# Patient Record
Sex: FEMALE | Race: WHITE | NOT HISPANIC OR LATINO | URBAN - METROPOLITAN AREA
[De-identification: names, ages, dates, MRNs, and addresses within clinical notes are randomized per-mention and may not be internally consistent; named-entity substitution may affect disease eponyms.]

---

## 2020-11-20 ENCOUNTER — TELEPHONE ENCOUNTER (OUTPATIENT)
Dept: URBAN - METROPOLITAN AREA CLINIC 92 | Facility: CLINIC | Age: 45
End: 2020-11-20

## 2020-12-08 ENCOUNTER — OFFICE VISIT (OUTPATIENT)
Dept: URBAN - NONMETROPOLITAN AREA CLINIC 13 | Facility: CLINIC | Age: 45
End: 2020-12-08

## 2021-01-05 ENCOUNTER — OFFICE VISIT (OUTPATIENT)
Dept: URBAN - NONMETROPOLITAN AREA CLINIC 13 | Facility: CLINIC | Age: 46
End: 2021-01-05

## 2021-08-03 ENCOUNTER — WEB ENCOUNTER (OUTPATIENT)
Dept: URBAN - NONMETROPOLITAN AREA CLINIC 2 | Facility: CLINIC | Age: 46
End: 2021-08-03

## 2021-08-12 ENCOUNTER — LAB OUTSIDE AN ENCOUNTER (OUTPATIENT)
Dept: URBAN - NONMETROPOLITAN AREA CLINIC 13 | Facility: CLINIC | Age: 46
End: 2021-08-12

## 2021-08-12 ENCOUNTER — OFFICE VISIT (OUTPATIENT)
Dept: URBAN - NONMETROPOLITAN AREA CLINIC 13 | Facility: CLINIC | Age: 46
End: 2021-08-12
Payer: OTHER GOVERNMENT

## 2021-08-12 ENCOUNTER — WEB ENCOUNTER (OUTPATIENT)
Dept: URBAN - NONMETROPOLITAN AREA CLINIC 13 | Facility: CLINIC | Age: 46
End: 2021-08-12

## 2021-08-12 VITALS
DIASTOLIC BLOOD PRESSURE: 86 MMHG | BODY MASS INDEX: 38.25 KG/M2 | TEMPERATURE: 98 F | HEART RATE: 74 BPM | HEIGHT: 68 IN | SYSTOLIC BLOOD PRESSURE: 151 MMHG | WEIGHT: 252.4 LBS

## 2021-08-12 DIAGNOSIS — R19.7 DIARRHEA: ICD-10-CM

## 2021-08-12 DIAGNOSIS — K21.9 GERD (GASTROESOPHAGEAL REFLUX DISEASE): ICD-10-CM

## 2021-08-12 DIAGNOSIS — R10.84 ABDOMINAL CRAMPING, GENERALIZED: ICD-10-CM

## 2021-08-12 PROCEDURE — 99214 OFFICE O/P EST MOD 30 MIN: CPT | Performed by: INTERNAL MEDICINE

## 2021-08-12 RX ORDER — GUAIFEN/P-PROPANOLAMIN/CODEINE
EXPECTORANT ORAL
Qty: 0 | Refills: 0 | Status: ACTIVE | COMMUNITY
Start: 1900-01-01 | End: 1900-01-01

## 2021-08-12 RX ORDER — OMEPRAZOLE 40 MG/1
1 CAPSULE 30 MINUTES BEFORE MEALS CAPSULE, DELAYED RELEASE PELLETS ORAL BID
Qty: 180 | Refills: 3
Start: 2018-07-22

## 2021-08-12 RX ORDER — DICYCLOMINE HYDROCHLORIDE 10 MG/1
TAKE 1 CAPSULE (10 MG) BY ORAL ROUTE 3 TIMES PER DAY 30MINS PREMEAL FOR ABDOMINAL PAIN/DIARRHEA CAPSULE ORAL THREE TIMES A DAY
Qty: 90 TABLET | Refills: 6
Start: 2019-03-14 | End: 2022-03-09

## 2021-08-12 RX ORDER — AMITRIPTYLINE HYDROCHLORIDE 75 MG/1
TABLET, FILM COATED ORAL
Qty: 0 | Refills: 0 | Status: ACTIVE | COMMUNITY
Start: 1900-01-01 | End: 1900-01-01

## 2021-08-12 NOTE — HPI-TODAY'S VISIT:
8/12/2021 Ms. Samantha Davalos is here for abdominal pain and diarrhea. She was last seen in 2019 for similar symptoms and JIMENA. She had a normal EGD/Colon/CTE/GES at that time. Her symptoms were well controlled with omeprazole and bentyl. She was doing really well so she stopped all her medications the first of the year. In March, she started having pain after eating and diarrhea again. She restarted the bentyl and omeprazole. They do not seem to be helping. She has also noticed fat in her stool and her stool smells like "fried chicken". Her stool is also pale. She no longer had a GB. She denies any hx of pancreatitis. She is not a diabetic. She tried to change her diet to low fat with no change. CS

## 2021-08-12 NOTE — HPI-OTHER HISTORIES
2019   Ms. Samantha Davalos is here for f/u of upper mid abdominal pain, diarrhea after eating, and reflux. She had an EGD that showed gastritis and duodenitis and a colonoscopy that was normal and normal random bx. She had a CTE that showed surgical changes from previous hernia surgery. At her last OV, she was started on omeprazole 40mg BID and bentyl TID. She had a GES that was normal. Today, she is feeling better. Her upper abdominal pain is starting to improve. Her diarrhea improved with the bentyl then she became constipated. She is only taking the Bentyl prn. Overall, she is feeling better. CS

## 2021-08-12 NOTE — PHYSICAL EXAM GASTROINTESTINAL
Abdomen , soft, epigastric tender, nondistended , no guarding or rigidity , no masses palpable , normal bowel sounds , Liver and Spleen , no hepatosplenomegaly , liver nontender , spleen not palpable

## 2021-09-21 ENCOUNTER — WEB ENCOUNTER (OUTPATIENT)
Dept: URBAN - NONMETROPOLITAN AREA CLINIC 13 | Facility: CLINIC | Age: 46
End: 2021-09-21

## 2021-09-21 ENCOUNTER — OFFICE VISIT (OUTPATIENT)
Dept: URBAN - NONMETROPOLITAN AREA CLINIC 13 | Facility: CLINIC | Age: 46
End: 2021-09-21
Payer: OTHER GOVERNMENT

## 2021-09-21 VITALS
BODY MASS INDEX: 38.04 KG/M2 | WEIGHT: 251 LBS | HEART RATE: 71 BPM | DIASTOLIC BLOOD PRESSURE: 84 MMHG | HEIGHT: 68 IN | SYSTOLIC BLOOD PRESSURE: 130 MMHG

## 2021-09-21 DIAGNOSIS — R10.9 ABDOMINAL PAIN: ICD-10-CM

## 2021-09-21 DIAGNOSIS — R19.7 DIARRHEA: ICD-10-CM

## 2021-09-21 DIAGNOSIS — K21.9 GERD (GASTROESOPHAGEAL REFLUX DISEASE): ICD-10-CM

## 2021-09-21 PROCEDURE — 99213 OFFICE O/P EST LOW 20 MIN: CPT | Performed by: INTERNAL MEDICINE

## 2021-09-21 RX ORDER — GUAIFEN/P-PROPANOLAMIN/CODEINE
EXPECTORANT ORAL
Qty: 0 | Refills: 0 | COMMUNITY
Start: 1900-01-01

## 2021-09-21 RX ORDER — OMEPRAZOLE 40 MG/1
1 CAPSULE 30 MINUTES BEFORE MEALS CAPSULE, DELAYED RELEASE PELLETS ORAL BID
Qty: 180 | Refills: 3

## 2021-09-21 RX ORDER — DICYCLOMINE HYDROCHLORIDE 10 MG/1
TAKE 1 CAPSULE (10 MG) BY ORAL ROUTE 3 TIMES PER DAY 30MINS PREMEAL FOR ABDOMINAL PAIN/DIARRHEA CAPSULE ORAL THREE TIMES A DAY
Qty: 90 TABLET | Refills: 6 | COMMUNITY
Start: 2019-03-14 | End: 2022-03-09

## 2021-09-21 RX ORDER — OMEPRAZOLE 40 MG/1
1 CAPSULE 30 MINUTES BEFORE MEALS CAPSULE, DELAYED RELEASE PELLETS ORAL BID
Qty: 180 | Refills: 3 | COMMUNITY
Start: 2018-07-22

## 2021-09-21 RX ORDER — RIFAXIMIN 550 MG/1
1 TABLET TABLET ORAL THREE TIMES A DAY
Qty: 42 TABLET | Refills: 2 | OUTPATIENT
Start: 2021-09-21 | End: 2021-11-02

## 2021-09-21 RX ORDER — AMITRIPTYLINE HYDROCHLORIDE 75 MG/1
TABLET, FILM COATED ORAL
Qty: 0 | Refills: 0 | COMMUNITY
Start: 1900-01-01

## 2021-09-21 RX ORDER — DICYCLOMINE HYDROCHLORIDE 10 MG/1
TAKE 1 CAPSULE (10 MG) BY ORAL ROUTE 3 TIMES PER DAY 30MINS PREMEAL FOR ABDOMINAL PAIN/DIARRHEA CAPSULE ORAL THREE TIMES A DAY
Qty: 90 TABLET | Refills: 6

## 2021-09-21 NOTE — HPI-TODAY'S VISIT:
9/21/2021 Ms. Samantha Davalos is here for f/u of abdominal pain and diarrhea. She was last seen in 2019 for similar symptoms and JIMENA. She had a normal EGD/Colon/CTE/GES at that time. Her symptoms were well controlled with omeprazole and bentyl. She was doing really well so she stopped all her medications the first of the year. Her symptoms returned along with fatty smelly stools. She had a Ct scan that was normal. Normal labs and negative fecal fats. Today on omeprazole and premeal bentyl she is doing better. If she misses a pill all her symptoms return. CS

## 2021-11-18 ENCOUNTER — DASHBOARD ENCOUNTERS (OUTPATIENT)
Age: 46
End: 2021-11-18

## 2021-11-18 ENCOUNTER — OFFICE VISIT (OUTPATIENT)
Dept: URBAN - NONMETROPOLITAN AREA CLINIC 13 | Facility: CLINIC | Age: 46
End: 2021-11-18
Payer: OTHER GOVERNMENT

## 2021-11-18 DIAGNOSIS — R19.7 DIARRHEA: ICD-10-CM

## 2021-11-18 DIAGNOSIS — K21.9 GERD (GASTROESOPHAGEAL REFLUX DISEASE): ICD-10-CM

## 2021-11-18 DIAGNOSIS — R10.9 ABDOMINAL PAIN: ICD-10-CM

## 2021-11-18 PROCEDURE — 99213 OFFICE O/P EST LOW 20 MIN: CPT | Performed by: NURSE PRACTITIONER

## 2021-11-18 RX ORDER — DICYCLOMINE HYDROCHLORIDE 10 MG/1
TAKE 1 CAPSULE (10 MG) BY ORAL ROUTE 3 TIMES PER DAY 30MINS PREMEAL FOR ABDOMINAL PAIN/DIARRHEA CAPSULE ORAL THREE TIMES A DAY
Qty: 90 TABLET | Refills: 6

## 2021-11-18 RX ORDER — OMEPRAZOLE 40 MG/1
1 CAPSULE 30 MINUTES BEFORE MEALS CAPSULE, DELAYED RELEASE PELLETS ORAL BID
Qty: 180 | Refills: 3

## 2021-11-18 RX ORDER — DICYCLOMINE HYDROCHLORIDE 10 MG/1
TAKE 1 CAPSULE (10 MG) BY ORAL ROUTE 3 TIMES PER DAY 30MINS PREMEAL FOR ABDOMINAL PAIN/DIARRHEA CAPSULE ORAL THREE TIMES A DAY
Qty: 90 TABLET | Refills: 6 | Status: ACTIVE | COMMUNITY

## 2021-11-18 RX ORDER — OMEPRAZOLE 40 MG/1
1 CAPSULE 30 MINUTES BEFORE MEALS CAPSULE, DELAYED RELEASE PELLETS ORAL BID
Qty: 180 | Refills: 3 | Status: ACTIVE | COMMUNITY

## 2021-11-18 RX ORDER — GUAIFEN/P-PROPANOLAMIN/CODEINE
EXPECTORANT ORAL
Qty: 0 | Refills: 0 | COMMUNITY
Start: 1900-01-01

## 2021-11-18 RX ORDER — AMITRIPTYLINE HYDROCHLORIDE 75 MG/1
TABLET, FILM COATED ORAL
Qty: 0 | Refills: 0 | COMMUNITY
Start: 1900-01-01

## 2021-11-18 NOTE — HPI-TODAY'S VISIT:
11/18/2021 Ms. Samantha Davalos is here for f/u of abdominal pain and diarrhea. She was last seen in 2019 for similar symptoms and JIMENA. She had a normal EGD/Colon/CTE/GES at that time. Her symptoms were well controlled with omeprazole and bentyl. She was doing really well so she stopped all her medications the first of the year. Her symptoms returned along with fatty smelly stools. She had a Ct scan that was normal. Normal labs and negative fecal fats. Today on omeprazole and premeal bentyl she is doing better. If she misses a pill all her symptoms return. CS

## 2021-11-18 NOTE — HPI-OTHER HISTORIES
8/12/2021 Ms. Samantha Davalos is here for abdominal pain and diarrhea. She was last seen in 2019 for similar symptoms and JIMENA. She had a normal EGD/Colon/CTE/GES at that time. Her symptoms were well controlled with omeprazole and bentyl. She was doing really well so she stopped all her medications the first of the year. In March, she started having pain after eating and diarrhea again. She restarted the bentyl and omeprazole. They do not seem to be helping. She has also noticed fat in her stool and her stool smells like "fried chicken". Her stool is also pale. She no longer had a GB. She denies any hx of pancreatitis. She is not a diabetic. She tried to change her diet to low fat with no change. CS   9/21/2021 Ms. Samantha Davalos is here for f/u of abdominal pain and diarrhea. She was last seen in 2019 for similar symptoms and JIMENA. She had a normal EGD/Colon/CTE/GES at that time. Her symptoms were well controlled with omeprazole and bentyl. She was doing really well so she stopped all her medications the first of the year. Her symptoms returned along with fatty smelly stools. She had a Ct scan that was normal. Normal labs and negative fecal fats. Today on omeprazole and premeal bentyl she is doing better. If she misses a pill all her symptoms return. CS

## 2022-02-17 ENCOUNTER — OFFICE VISIT (OUTPATIENT)
Dept: URBAN - NONMETROPOLITAN AREA CLINIC 13 | Facility: CLINIC | Age: 47
End: 2022-02-17

## 2022-11-12 ENCOUNTER — WEB ENCOUNTER (OUTPATIENT)
Dept: URBAN - NONMETROPOLITAN AREA CLINIC 13 | Facility: CLINIC | Age: 47
End: 2022-11-12

## 2022-11-12 RX ORDER — DICYCLOMINE HYDROCHLORIDE 10 MG/1
TAKE 1 CAPSULE (10 MG) BY ORAL ROUTE 3 TIMES PER DAY 30MINS PREMEAL FOR ABDOMINAL PAIN/DIARRHEA CAPSULE ORAL THREE TIMES A DAY
Qty: 270 | Refills: 3

## 2023-08-08 ENCOUNTER — TELEPHONE ENCOUNTER (OUTPATIENT)
Dept: URBAN - METROPOLITAN AREA CLINIC 54 | Facility: CLINIC | Age: 48
End: 2023-08-08

## 2025-04-03 ENCOUNTER — LAB OUTSIDE AN ENCOUNTER (OUTPATIENT)
Dept: URBAN - NONMETROPOLITAN AREA CLINIC 2 | Facility: CLINIC | Age: 50
End: 2025-04-03

## 2025-04-03 ENCOUNTER — OFFICE VISIT (OUTPATIENT)
Dept: URBAN - NONMETROPOLITAN AREA CLINIC 13 | Facility: CLINIC | Age: 50
End: 2025-04-03
Payer: OTHER GOVERNMENT

## 2025-04-03 DIAGNOSIS — R10.9 ABDOMINAL PAIN: ICD-10-CM

## 2025-04-03 DIAGNOSIS — K58.0 IRRITABLE BOWEL SYNDROME WITH DIARRHEA: ICD-10-CM

## 2025-04-03 DIAGNOSIS — K21.9 GERD (GASTROESOPHAGEAL REFLUX DISEASE): ICD-10-CM

## 2025-04-03 PROBLEM — 197125005: Status: ACTIVE | Noted: 2025-04-03

## 2025-04-03 LAB
A/G RATIO: 1.4
ALBUMIN: 4
ALKALINE PHOSPHATASE: 64
ALT (SGPT): 18
ANION GAP: 9
AST (SGOT): 15
BILIRUBIN TOTAL: 0.5
BLOOD UREA NITROGEN: 13
BUN / CREAT RATIO: 14
CALCIUM: 9.2
CHLORIDE: 105
CO2: 28
CREATININE, SERUM: 0.95
EGFR (CKD-EPI): >60
FREE T4: 0.8
GLUCOSE: 71
HEMATOCRIT: 43.9
HEMOGLOBIN: 14.8
MEAN CORPUSCULAR HEMOGLOBIN CONC: 33.8
MEAN CORPUSCULAR HEMOGLOBIN: 29.7
MEAN CORPUSCULAR VOLUME: 88
MEAN PLATELET VOLUME: 7.4
PLATELET COUNT: 262
POTASSIUM: 3.9
PROTEIN TOTAL: 6.9
RED BLOOD CELL COUNT: 4.99
RED CELL DISTRIBUTION WIDTH: 14.4
SODIUM: 138
TSH: 1.67
WHITE BLOOD CELL COUNT: 6.1

## 2025-04-03 PROCEDURE — 99204 OFFICE O/P NEW MOD 45 MIN: CPT | Performed by: NURSE PRACTITIONER

## 2025-04-03 RX ORDER — OMEPRAZOLE 40 MG/1
1 CAPSULE 30 MINUTES BEFORE MEALS CAPSULE, DELAYED RELEASE PELLETS ORAL BID
Qty: 180 | Refills: 3 | Status: ACTIVE | COMMUNITY

## 2025-04-03 RX ORDER — GUAIFEN/P-PROPANOLAMIN/CODEINE
EXPECTORANT ORAL
Qty: 0 | Refills: 0 | COMMUNITY
Start: 1900-01-01

## 2025-04-03 RX ORDER — RIFAXIMIN 550 MG/1
1 TABLET TABLET ORAL THREE TIMES A DAY
Qty: 42 TABLET | Refills: 3 | OUTPATIENT
Start: 2025-04-03 | End: 2025-05-29

## 2025-04-03 RX ORDER — ESCITALOPRAM 20 MG/1
TAKE TWO TABLETS BY MOUTH ONE TIME DAILY AS DIRECTED FOR DEPRESSION TABLET, FILM COATED ORAL
Qty: 180 UNSPECIFIED | Refills: 0 | Status: ACTIVE | COMMUNITY

## 2025-04-03 RX ORDER — OMEPRAZOLE 40 MG/1
1 CAPSULE 1/2 TO 1 HOUR BEFORE MORNING MEAL CAPSULE, DELAYED RELEASE PELLETS ORAL ONCE A DAY
Qty: 90 | Refills: 3
Start: 2025-04-03

## 2025-04-03 RX ORDER — AMITRIPTYLINE HYDROCHLORIDE 75 MG/1
TABLET, FILM COATED ORAL
Qty: 0 | Refills: 0 | Status: DISCONTINUED | COMMUNITY
Start: 1900-01-01

## 2025-04-03 RX ORDER — DICYCLOMINE HYDROCHLORIDE 10 MG/1
TAKE 1 CAPSULE (10 MG) BY ORAL ROUTE 3 TIMES PER DAY 30MINS PREMEAL FOR ABDOMINAL PAIN/DIARRHEA CAPSULE ORAL THREE TIMES A DAY
Qty: 270 | Refills: 3 | Status: ACTIVE | COMMUNITY

## 2025-04-03 RX ORDER — DICYCLOMINE HYDROCHLORIDE 20 MG/1
TAKE 1 CAPSULE (10 MG) BY ORAL ROUTE 3 TIMES PER DAY 30MINS PREMEAL FOR ABDOMINAL PAIN/DIARRHEA TABLET ORAL THREE TIMES A DAY
Qty: 90 TABLET | Refills: 6
Start: 2025-04-03

## 2025-04-03 NOTE — HPI-TODAY'S VISIT:
4/3/2025 Ms. Samantha Davalos is here for f/u of IBS-D and reflux. She was last seen in 2021. Her symptoms at that time were well controlled on omeprazole and premeal bentyl. If she missed a pill all her symptoms return. Today, she has been having diarrhea for the last year despite taking bentyl prior to eating. She can not think of anything that happened to cause this change. She did try semiglutide for 2 months and this resovled her diarrhea. She denies any blood in her stool or weight loss. Her reflux is well controlled. CS

## 2025-04-03 NOTE — HPI-OTHER HISTORIES
8/12/2021 Ms. Samantha Davalos is here for abdominal pain and diarrhea. She was last seen in 2019 for similar symptoms and JIMENA. She had a normal EGD/Colon/CTE/GES at that time. Her symptoms were well controlled with omeprazole and bentyl. She was doing really well so she stopped all her medications the first of the year. In March, she started having pain after eating and diarrhea again. She restarted the bentyl and omeprazole. They do not seem to be helping. She has also noticed fat in her stool and her stool smells like "fried chicken". Her stool is also pale. She no longer had a GB. She denies any hx of pancreatitis. She is not a diabetic. She tried to change her diet to low fat with no change. CS   9/21/2021 Ms. Samantha Davalos is here for f/u of abdominal pain and diarrhea. She was last seen in 2019 for similar symptoms and JIMENA. She had a normal EGD/Colon/CTE/GES at that time. Her symptoms were well controlled with omeprazole and bentyl. She was doing really well so she stopped all her medications the first of the year. Her symptoms returned along with fatty smelly stools. She had a Ct scan that was normal. Normal labs and negative fecal fats. Today on omeprazole and premeal bentyl she is doing better. If she misses a pill all her symptoms return. CS  11/18/2021 Ms. Samantha Davalos is here for f/u of abdominal pain and diarrhea. She was last seen in 2019 for similar symptoms and JIMENA. She had a normal EGD/Colon/CTE/GES at that time. Her symptoms were well controlled with omeprazole and bentyl. She was doing really well so she stopped all her medications the first of the year. Her symptoms returned along with fatty smelly stools. She had a Ct scan that was normal. Normal labs and negative fecal fats. Today on omeprazole and premeal bentyl she is doing better. If she misses a pill all her symptoms return.

## 2025-04-08 ENCOUNTER — TELEPHONE ENCOUNTER (OUTPATIENT)
Dept: URBAN - NONMETROPOLITAN AREA CLINIC 2 | Facility: CLINIC | Age: 50
End: 2025-04-08

## 2025-04-09 LAB
GAMMAGLOBULIN; IGA: 189
INTERP CELIAC DISEASE: (no result)
TTG AB IGA: <1

## 2025-04-17 ENCOUNTER — LAB OUTSIDE AN ENCOUNTER (OUTPATIENT)
Dept: URBAN - NONMETROPOLITAN AREA CLINIC 2 | Facility: CLINIC | Age: 50
End: 2025-04-17

## 2025-04-17 LAB
ADENOVIRUS F40/41: NOT DETECTED
ASTROVIRUS: NOT DETECTED
CAMPYLOBACTER SPECIES: DETECTED
CLOSTRIDIUM DIFFICILE TOXIN: NOT DETECTED
CRYPTOSPORIDIUM SPECIES: NOT DETECTED
CYCLOSPORA CAYETANENSIS: NOT DETECTED
ENTAMOEBA HISTOLYTICA DNA: NOT DETECTED
ENTEROAGGREGATIVE E. COLI (EAEC): NOT DETECTED
ENTEROPATHOGENIC E. COLI (EPEC): NOT DETECTED
ENTEROTOXIGENIC E. COLI (ETEC): NOT DETECTED
GIARDIA LAMBLIA DNA: NOT DETECTED
Lab: NEGATIVE
Lab: NEGATIVE
NOROVIRUS GI/GII: NOT DETECTED
PLESIOMONAS SHIGELLOIDES: NOT DETECTED
ROTAVIRUS RNA: NOT DETECTED
SALMONELLA SPECIES: NOT DETECTED
SAPOVIRUS: NOT DETECTED
SHIGA TOXIN PRODUCING E. COLI: NOT DETECTED
SHIGELLA/ENTEROINVASIVE E. COLI: NOT DETECTED
VIBRIO CHOLERAE: NOT DETECTED
VIBRIO SPECIES: NOT DETECTED
YERSINIA ENTEROCOLITICA: NOT DETECTED

## 2025-04-18 ENCOUNTER — TELEPHONE ENCOUNTER (OUTPATIENT)
Dept: URBAN - NONMETROPOLITAN AREA CLINIC 2 | Facility: CLINIC | Age: 50
End: 2025-04-18

## 2025-04-26 LAB — PANCREATIC ELASTASE-1: >800

## 2025-06-05 ENCOUNTER — OFFICE VISIT (OUTPATIENT)
Dept: URBAN - NONMETROPOLITAN AREA CLINIC 13 | Facility: CLINIC | Age: 50
End: 2025-06-05